# Patient Record
Sex: MALE | Race: WHITE | NOT HISPANIC OR LATINO | Employment: OTHER | URBAN - METROPOLITAN AREA
[De-identification: names, ages, dates, MRNs, and addresses within clinical notes are randomized per-mention and may not be internally consistent; named-entity substitution may affect disease eponyms.]

---

## 2023-11-21 ENCOUNTER — OFFICE VISIT (OUTPATIENT)
Dept: FAMILY MEDICINE CLINIC | Facility: CLINIC | Age: 68
End: 2023-11-21
Payer: MEDICARE

## 2023-11-21 VITALS
RESPIRATION RATE: 18 BRPM | DIASTOLIC BLOOD PRESSURE: 82 MMHG | HEIGHT: 68 IN | BODY MASS INDEX: 31.22 KG/M2 | SYSTOLIC BLOOD PRESSURE: 120 MMHG | OXYGEN SATURATION: 98 % | WEIGHT: 206 LBS | HEART RATE: 72 BPM

## 2023-11-21 DIAGNOSIS — R68.82 DECREASED LIBIDO: ICD-10-CM

## 2023-11-21 DIAGNOSIS — E66.9 OBESITY (BMI 30.0-34.9): Primary | ICD-10-CM

## 2023-11-21 DIAGNOSIS — Z11.59 NEED FOR HEPATITIS C SCREENING TEST: ICD-10-CM

## 2023-11-21 DIAGNOSIS — Z13.220 SCREENING FOR HYPERLIPIDEMIA: ICD-10-CM

## 2023-11-21 DIAGNOSIS — Z00.00 ENCOUNTER FOR ANNUAL WELLNESS EXAM IN MEDICARE PATIENT: ICD-10-CM

## 2023-11-21 DIAGNOSIS — I10 PRIMARY HYPERTENSION: ICD-10-CM

## 2023-11-21 DIAGNOSIS — Z13.228 ENCOUNTER FOR SCREENING FOR OTHER METABOLIC DISORDERS: ICD-10-CM

## 2023-11-21 PROBLEM — E66.811 OBESITY (BMI 30.0-34.9): Status: ACTIVE | Noted: 2023-11-21

## 2023-11-21 LAB
ALBUMIN SERPL-MCNC: 4.6 G/DL (ref 3.5–5.2)
ALBUMIN/GLOB SERPL: 1.9 G/DL
ALP SERPL-CCNC: 83 U/L (ref 39–117)
ALT SERPL-CCNC: 20 U/L (ref 1–41)
AST SERPL-CCNC: 21 U/L (ref 1–40)
BASOPHILS # BLD AUTO: 0.02 10*3/MM3 (ref 0–0.2)
BASOPHILS NFR BLD AUTO: 0.3 % (ref 0–1.5)
BILIRUB SERPL-MCNC: 0.6 MG/DL (ref 0–1.2)
BUN SERPL-MCNC: 24 MG/DL (ref 8–23)
BUN/CREAT SERPL: 15.2 (ref 7–25)
CALCIUM SERPL-MCNC: 9.7 MG/DL (ref 8.6–10.5)
CHLORIDE SERPL-SCNC: 107 MMOL/L (ref 98–107)
CHOLEST SERPL-MCNC: 200 MG/DL (ref 0–200)
CHOLEST/HDLC SERPL: 4.76 {RATIO}
CO2 SERPL-SCNC: 25.1 MMOL/L (ref 22–29)
CREAT SERPL-MCNC: 1.58 MG/DL (ref 0.76–1.27)
EGFRCR SERPLBLD CKD-EPI 2021: 47.4 ML/MIN/1.73
EOSINOPHIL # BLD AUTO: 0.14 10*3/MM3 (ref 0–0.4)
EOSINOPHIL NFR BLD AUTO: 2.4 % (ref 0.3–6.2)
ERYTHROCYTE [DISTWIDTH] IN BLOOD BY AUTOMATED COUNT: 13 % (ref 12.3–15.4)
GLOBULIN SER CALC-MCNC: 2.4 GM/DL
GLUCOSE SERPL-MCNC: 92 MG/DL (ref 65–99)
HCT VFR BLD AUTO: 52.8 % (ref 37.5–51)
HDLC SERPL-MCNC: 42 MG/DL (ref 40–60)
HGB BLD-MCNC: 17.2 G/DL (ref 13–17.7)
IMM GRANULOCYTES # BLD AUTO: 0.02 10*3/MM3 (ref 0–0.05)
IMM GRANULOCYTES NFR BLD AUTO: 0.3 % (ref 0–0.5)
LDLC SERPL CALC-MCNC: 107 MG/DL (ref 0–100)
LYMPHOCYTES # BLD AUTO: 1.77 10*3/MM3 (ref 0.7–3.1)
LYMPHOCYTES NFR BLD AUTO: 29.7 % (ref 19.6–45.3)
MCH RBC QN AUTO: 28.3 PG (ref 26.6–33)
MCHC RBC AUTO-ENTMCNC: 32.6 G/DL (ref 31.5–35.7)
MCV RBC AUTO: 87 FL (ref 79–97)
MONOCYTES # BLD AUTO: 0.56 10*3/MM3 (ref 0.1–0.9)
MONOCYTES NFR BLD AUTO: 9.4 % (ref 5–12)
NEUTROPHILS # BLD AUTO: 3.44 10*3/MM3 (ref 1.7–7)
NEUTROPHILS NFR BLD AUTO: 57.9 % (ref 42.7–76)
NRBC BLD AUTO-RTO: 0 /100 WBC (ref 0–0.2)
PLATELET # BLD AUTO: 206 10*3/MM3 (ref 140–450)
POTASSIUM SERPL-SCNC: 5 MMOL/L (ref 3.5–5.2)
PROT SERPL-MCNC: 7 G/DL (ref 6–8.5)
RBC # BLD AUTO: 6.07 10*6/MM3 (ref 4.14–5.8)
SODIUM SERPL-SCNC: 143 MMOL/L (ref 136–145)
TRIGL SERPL-MCNC: 296 MG/DL (ref 0–150)
VLDLC SERPL CALC-MCNC: 51 MG/DL (ref 5–40)
WBC # BLD AUTO: 5.95 10*3/MM3 (ref 3.4–10.8)

## 2023-11-21 RX ORDER — TELMISARTAN 20 MG/1
10 TABLET ORAL DAILY
COMMUNITY
End: 2023-11-21 | Stop reason: SDUPTHER

## 2023-11-21 RX ORDER — FLUTICASONE PROPIONATE 50 MCG
SPRAY, SUSPENSION (ML) NASAL
COMMUNITY
Start: 2023-09-02

## 2023-11-21 RX ORDER — TELMISARTAN 20 MG/1
10 TABLET ORAL DAILY
Qty: 90 TABLET | Refills: 1 | Status: SHIPPED | OUTPATIENT
Start: 2023-11-21

## 2023-11-21 NOTE — PROGRESS NOTES
The ABCs of the Annual Wellness Visit  Subsequent Medicare Wellness Visit    Subjective      Jenn Gaines is a 68 y.o. male who presents for a Subsequent Medicare Wellness Visit.    Patient has decreased libido. Is an ongoing issue. He reports that this is affecting his marriage. He is requesting referral to urology.    Patient is new to me and new to the office. History of hyptertension, gout, kidney stones, decreased gfr, hearing loss.     The following portions of the patient's history were reviewed and   updated as appropriate: allergies, current medications, past family history, past medical history, past social history, past surgical history, and problem list.    Compared to one year ago, the patient feels his physical   health is the same.    Compared to one year ago, the patient feels his mental   health is worse.    Recent Hospitalizations:  He was not admitted to the hospital during the last year.       Current Medical Providers:  Patient Care Team:  Diana Ocasio APRN as PCP - General (Nurse Practitioner)    Outpatient Medications Prior to Visit   Medication Sig Dispense Refill    fluticasone (FLONASE) 50 MCG/ACT nasal spray       telmisartan (MICARDIS) 20 MG tablet Take 0.5 tablets by mouth Daily.       No facility-administered medications prior to visit.       No opioid medication identified on active medication list. I have reviewed chart for other potential  high risk medication/s and harmful drug interactions in the elderly.        Aspirin is not on active medication list.  Aspirin use is not indicated based on review of current medical condition/s. Risk of harm outweighs potential benefits.  .    Patient Active Problem List   Diagnosis    Obesity (BMI 30.0-34.9)     Advance Care Planning   Advance Care Planning     Advance Directive is not on file.  ACP discussion was held with the patient during this visit. Patient does not have an advance directive, information provided.     Objective   "  Vitals:    23 0858   BP: 120/82   Pulse: 72   Resp: 18   SpO2: 98%   Weight: 93.4 kg (206 lb)   Height: 172.7 cm (68\")     Estimated body mass index is 31.32 kg/m² as calculated from the following:    Height as of this encounter: 172.7 cm (68\").    Weight as of this encounter: 93.4 kg (206 lb).    BMI is >= 30 and <35. (Class 1 Obesity). The following options were offered after discussion;: exercise counseling/recommendations and nutrition counseling/recommendations      Does the patient have evidence of cognitive impairment?   No            HEALTH RISK ASSESSMENT    Smoking Status:  Social History     Tobacco Use   Smoking Status Never   Smokeless Tobacco Never     Alcohol Consumption:  Social History     Substance and Sexual Activity   Alcohol Use Yes     Fall Risk Screen:    STEADI Fall Risk Assessment was completed, and patient is at LOW risk for falls.Assessment completed on:2023    Depression Screenin/21/2023     8:59 AM   PHQ-2/PHQ-9 Depression Screening   Little Interest or Pleasure in Doing Things 0-->not at all   Feeling Down, Depressed or Hopeless 0-->not at all   PHQ-9: Brief Depression Severity Measure Score 0       Health Habits and Functional and Cognitive Screenin/21/2023     9:00 AM   Functional & Cognitive Status   Do you have difficulty preparing food and eating? No   Do you have difficulty bathing yourself, getting dressed or grooming yourself? No   Do you have difficulty using the toilet? No   Do you have difficulty moving around from place to place? No   Do you have trouble with steps or getting out of a bed or a chair? No   Current Diet Well Balanced Diet   Dental Exam Up to date   Eye Exam Up to date   Exercise (times per week) 3 times per week   Current Exercises Include Walking   Do you need help using the phone?  No   Are you deaf or do you have serious difficulty hearing?  No   Do you need help to go to places out of walking distance? No   Do you need " help shopping? No   Do you need help preparing meals?  No   Do you need help with housework?  No   Do you need help with laundry? No   Do you need help taking your medications? No   Do you need help managing money? No   Do you ever drive or ride in a car without wearing a seat belt? No   Have you felt unusual stress, anger or loneliness in the last month? No   Who do you live with? Spouse   If you need help, do you have trouble finding someone available to you? No   Have you been bothered in the last four weeks by sexual problems? No   Do you have difficulty concentrating, remembering or making decisions? No       Age-appropriate Screening Schedule:  Refer to the list below for future screening recommendations based on patient's age, sex and/or medical conditions. Orders for these recommended tests are listed in the plan section. The patient has been provided with a written plan.    Health Maintenance   Topic Date Due    COVID-19 Vaccine (1) Never done    TDAP/TD VACCINES (1 - Tdap) Never done    ZOSTER VACCINE (1 of 2) Never done    Pneumococcal Vaccine 65+ (1 - PCV) Never done    INFLUENZA VACCINE  Never done    HEPATITIS C SCREENING  Never done    COLORECTAL CANCER SCREENING  03/15/2024    ANNUAL WELLNESS VISIT  11/21/2024    BMI FOLLOWUP  11/21/2024                  CMS Preventative Services Quick Reference  Risk Factors Identified During Encounter:    None Identified    The above risks/problems have been discussed with the patient.  Pertinent information has been shared with the patient in the After Visit Summary.    Diagnoses and all orders for this visit:    1. Obesity (BMI 30.0-34.9) (Primary)    2. Encounter for annual wellness exam in Medicare patient    3. Primary hypertension  -     Comprehensive Metabolic Panel  -     telmisartan (MICARDIS) 20 MG tablet; Take 0.5 tablets by mouth Daily.  Dispense: 90 tablet; Refill: 1    4. Decreased libido  -     Ambulatory Referral to Urology    5. Need for hepatitis  C screening test  -     Hepatitis C Antibody    6. Screening for hyperlipidemia  -     Lipid Panel With / Chol / HDL Ratio    7. Encounter for screening for other metabolic disorders  -     CBC & Differential        Follow Up:   Next Medicare Wellness visit to be scheduled in 1 year.      An After Visit Summary and PPPS were made available to the patient.    Lipid panel  CMP  CBC  Hepatitis C Screening  Referral to urology

## 2023-11-22 LAB — HCV IGG SERPL QL IA: NON REACTIVE

## 2023-11-23 DIAGNOSIS — I10 PRIMARY HYPERTENSION: ICD-10-CM

## 2023-11-23 DIAGNOSIS — R79.89 ELEVATED SERUM CREATININE: Primary | ICD-10-CM

## 2024-03-07 ENCOUNTER — LAB (OUTPATIENT)
Dept: LAB | Facility: HOSPITAL | Age: 69
End: 2024-03-07
Payer: MEDICARE

## 2024-03-07 ENCOUNTER — CONSULT (OUTPATIENT)
Dept: ONCOLOGY | Facility: CLINIC | Age: 69
End: 2024-03-07
Payer: MEDICARE

## 2024-03-07 VITALS
HEART RATE: 67 BPM | RESPIRATION RATE: 18 BRPM | TEMPERATURE: 97.9 F | BODY MASS INDEX: 30.66 KG/M2 | OXYGEN SATURATION: 95 % | SYSTOLIC BLOOD PRESSURE: 136 MMHG | DIASTOLIC BLOOD PRESSURE: 91 MMHG | HEIGHT: 68 IN | WEIGHT: 202.3 LBS

## 2024-03-07 DIAGNOSIS — D75.1 POLYCYTHEMIA: Primary | ICD-10-CM

## 2024-03-07 DIAGNOSIS — E87.70 HYPERVOLEMIA, UNSPECIFIED HYPERVOLEMIA TYPE: Primary | ICD-10-CM

## 2024-03-07 LAB
BASOPHILS # BLD AUTO: 0.03 10*3/MM3 (ref 0–0.2)
BASOPHILS NFR BLD AUTO: 0.4 % (ref 0–1.5)
DEPRECATED RDW RBC AUTO: 37.8 FL (ref 37–54)
EOSINOPHIL # BLD AUTO: 0.12 10*3/MM3 (ref 0–0.4)
EOSINOPHIL NFR BLD AUTO: 1.5 % (ref 0.3–6.2)
ERYTHROCYTE [DISTWIDTH] IN BLOOD BY AUTOMATED COUNT: 12.5 % (ref 12.3–15.4)
HCT VFR BLD AUTO: 48.8 % (ref 37.5–51)
HGB BLD-MCNC: 17.5 G/DL (ref 13–17.7)
IMM GRANULOCYTES # BLD AUTO: 0.13 10*3/MM3 (ref 0–0.05)
IMM GRANULOCYTES NFR BLD AUTO: 1.6 % (ref 0–0.5)
LYMPHOCYTES # BLD AUTO: 1.67 10*3/MM3 (ref 0.7–3.1)
LYMPHOCYTES NFR BLD AUTO: 20.5 % (ref 19.6–45.3)
MCH RBC QN AUTO: 29.8 PG (ref 26.6–33)
MCHC RBC AUTO-ENTMCNC: 35.9 G/DL (ref 31.5–35.7)
MCV RBC AUTO: 83.1 FL (ref 79–97)
MONOCYTES # BLD AUTO: 0.76 10*3/MM3 (ref 0.1–0.9)
MONOCYTES NFR BLD AUTO: 9.3 % (ref 5–12)
NEUTROPHILS NFR BLD AUTO: 5.42 10*3/MM3 (ref 1.7–7)
NEUTROPHILS NFR BLD AUTO: 66.7 % (ref 42.7–76)
NRBC BLD AUTO-RTO: 0 /100 WBC (ref 0–0.2)
PLATELET # BLD AUTO: 208 10*3/MM3 (ref 140–450)
PMV BLD AUTO: 9.5 FL (ref 6–12)
RBC # BLD AUTO: 5.87 10*6/MM3 (ref 4.14–5.8)
WBC NRBC COR # BLD AUTO: 8.13 10*3/MM3 (ref 3.4–10.8)

## 2024-03-07 PROCEDURE — 36415 COLL VENOUS BLD VENIPUNCTURE: CPT

## 2024-03-07 PROCEDURE — 85025 COMPLETE CBC W/AUTO DIFF WBC: CPT

## 2024-03-07 RX ORDER — ALLOPURINOL 100 MG/1
1 TABLET ORAL DAILY
COMMUNITY
Start: 2024-01-24 | End: 2025-01-23

## 2024-03-07 RX ORDER — AMLODIPINE BESYLATE 5 MG/1
1 TABLET ORAL DAILY
COMMUNITY
Start: 2024-01-24 | End: 2025-01-23

## 2024-03-07 RX ORDER — COLCHICINE 0.6 MG/1
0.6 TABLET ORAL
COMMUNITY

## 2024-03-07 NOTE — PROGRESS NOTES
[facility]    CONSULTING IN BLOOD DISORDERS & CANCER      REASON FOR CONSULTATION/CHIEF COMPLAINT:     Evaluation and management for polycythemia                             REQUESTING PHYSICIAN: Will Padilla Jr.*  RECORDS OBTAINED:  Records of the patients history including those from the electronic medical record were reviewed and summarized in detail.    HISTORY OF PRESENT ILLNESS:    The patient is a 68 y.o. year old male with medical history significant for hypertension, gout and CKD-3 who was noted to have elevated hemoglobin/hematocrit of 18.0/53.1 on a routine CBC performed by his urologist, Dr. Padilla on 2/9/2024.  WBC, platelets and differential were within normal range.    Patient was referred to hematology for further evaluation.  On review of his chart, he had elevated hemoglobin of 17.2 back in November 2023 as well.  No prior blood test results are available at this time.   Patient states he moved to [place] only around October - November 2023.  Prior to that he was living in [place] and [place].  Patient denies any knowledge of having abnormally elevated RBC/hemoglobin or any other blood abnormalities.    No history of thrombosis.  Is a non-smoker.  He does have erectile dysfunction, however does not take any testosterone supplements.  Patient does report of snoring at nighttime and having issues with sleep apnea whenever he gains weight.  He has been trying to lose weight.  No family history of blood or bone marrow disorders.    Past Medical History:   Diagnosis Date    Decreased GFR     Gout     HL (hearing loss)     Hypertension     Kidney stone      No past surgical history on file.    MEDICATIONS    Current Outpatient Medications:     allopurinol (ZYLOPRIM) 100 MG tablet, Take 1 tablet by mouth Daily., Disp: , Rfl:     amLODIPine (NORVASC) 5 MG tablet, Take 1 tablet by mouth Daily., Disp: , Rfl:     colchicine 0.6 MG tablet, Take 1 tablet by mouth., Disp: , Rfl:     fluticasone  "(FLONASE) 50 MCG/ACT nasal spray, , Disp: , Rfl:     telmisartan (MICARDIS) 20 MG tablet, Take 0.5 tablets by mouth Daily., Disp: 90 tablet, Rfl: 1    ALLERGIES:   No Known Allergies    SOCIAL HISTORY:       Social History     Socioeconomic History    Marital status:    Tobacco Use    Smoking status: Never    Smokeless tobacco: Never   Substance and Sexual Activity    Alcohol use: Yes    Drug use: Never    Sexual activity: Defer         FAMILY HISTORY:  Family History   Problem Relation Age of Onset    No Known Problems Mother     Other Father          from accident    Cancer Father     No Known Problems Sister        REVIEW OF SYSTEMS:  As per HPI       Vitals:    24 1000   BP: 136/91   Pulse: 67   Resp: 18   Temp: 97.9 °F (36.6 °C)   TempSrc: Temporal   SpO2: 95%   Weight: 91.8 kg (202 lb 4.8 oz)   Height: 172.7 cm (67.99\")   PainSc: 0-No pain         3/7/2024     9:55 AM   Current Status   ECOG score 0      PHYSICAL EXAM:    CONSTITUTIONAL:  Vital signs reviewed.  No distress, looks comfortable.  EYES:  Conjunctiva and lids unremarkable.   EARS,NOSE,MOUTH,THROAT:  Ears and nose appear unremarkable.  Lips, teeth, gums appear unremarkable.  RESPIRATORY:  Normal respiratory effort.  Lungs clear to auscultation bilaterally.  CARDIOVASCULAR:  Normal S1, S2.  No murmurs rubs or gallops.  No significant lower extremity edema.  GASTROINTESTINAL: Abdomen appears unremarkable.  Nondistended  LYMPHATIC:  No cervical, supraclavicular lymphadenopathy.  NEURO: AAO x 3, no focal deficits.  Appears to have equal strength all 4 extremities.  MUSCULOSKELETAL:  Unremarkable digits/nails.  No cyanosis or clubbing.  No apparent joint deformities.  SKIN:  Warm.  No rashes.  PSYCHIATRIC:  Normal judgment and insight.  Normal mood and affect.     RECENT LABS:        Lab on 2024   Component Date Value Ref Range Status    WBC 2024 8.13  3.40 - 10.80 10*3/mm3 Final    RBC 2024 5.87 (H)  4.14 - 5.80 " 10*6/mm3 Final    Hemoglobin 03/07/2024 17.5  13.0 - 17.7 g/dL Final    Hematocrit 03/07/2024 48.8  37.5 - 51.0 % Final    MCV 03/07/2024 83.1  79.0 - 97.0 fL Final    MCH 03/07/2024 29.8  26.6 - 33.0 pg Final    MCHC 03/07/2024 35.9 (H)  31.5 - 35.7 g/dL Final    RDW 03/07/2024 12.5  12.3 - 15.4 % Final    RDW-SD 03/07/2024 37.8  37.0 - 54.0 fl Final    MPV 03/07/2024 9.5  6.0 - 12.0 fL Final    Platelets 03/07/2024 208  140 - 450 10*3/mm3 Final    Neutrophil % 03/07/2024 66.7  42.7 - 76.0 % Final    Lymphocyte % 03/07/2024 20.5  19.6 - 45.3 % Final    Monocyte % 03/07/2024 9.3  5.0 - 12.0 % Final    Eosinophil % 03/07/2024 1.5  0.3 - 6.2 % Final    Basophil % 03/07/2024 0.4  0.0 - 1.5 % Final    Immature Grans % 03/07/2024 1.6 (H)  0.0 - 0.5 % Final    Neutrophils, Absolute 03/07/2024 5.42  1.70 - 7.00 10*3/mm3 Final    Lymphocytes, Absolute 03/07/2024 1.67  0.70 - 3.10 10*3/mm3 Final    Monocytes, Absolute 03/07/2024 0.76  0.10 - 0.90 10*3/mm3 Final    Eosinophils, Absolute 03/07/2024 0.12  0.00 - 0.40 10*3/mm3 Final    Basophils, Absolute 03/07/2024 0.03  0.00 - 0.20 10*3/mm3 Final    Immature Grans, Absolute 03/07/2024 0.13 (H)  0.00 - 0.05 10*3/mm3 Final    nRBC 03/07/2024 0.0  0.0 - 0.2 /100 WBC Final         ASSESSMENT:  Patient is a pleasant 68-year-old male with medical history significant for hypertension, gout and CKD-3 who comes for polyp team evaluation and management.     # Polycythemia:   Patient was noted to have elevated hemoglobin/hematocrit of 18.0/53.1 on a routine CBC performed by his urologist, Dr. Padilla on 2/9/2024.  WBC, platelets and differential were within normal range.  On review of his chart, he had elevated hemoglobin of 17.2 back in November 2023 as well.  No prior blood test results are available at this time.   Patient states he moved to [place] only around October - November 2023.  Prior to that he was living in [place] and [place].  Patient denies any knowledge of having  abnormally elevated RBC/hemoglobin or any other blood abnormalities.  No history of thrombosis.  Is a non-smoker.  He does have erectile dysfunction, however does not take any testosterone supplements.  Patient does report of snoring at nighttime and having issues with sleep apnea whenever he gains weight.  He has been trying to lose weight. No family history of blood or bone marrow disorders.  The CBC performed in clinic on 3/7/2024 noted high normal hemoglobin of 17.5 and hematocrit of 48.8.  Patient does not have any symptoms of hyperviscosity.  Since hemoglobin is still within normal range, will defer further workup at this time.    Informed patient that his mild polycythemia is likely related to dehydration and underlying sleep apnea.  Patient was encouraged to maintain adequate hydration and possibly lose some weight if he can.  He is also considering blood donation when can.  Plan made to repeat CBC and check EPO level at follow-up in 6 months.    # ED: As per urology    # Hypertension and CKD: As per PCP    PLAN:   -Likely dehydration and sleep apnea related mild polycythemia  -Advised adequate hydration and losing weight  -Follow-up in 6 months with repeat labs or sooner if needed    Orders Placed This Encounter   Procedures    Erythropoietin     Standing Status:   Future     Standing Expiration Date:   3/7/2025     Order Specific Question:   Release to patient     Answer:   Routine Release [[id number]]    CBC & Differential     Standing Status:   Future     Standing Expiration Date:   3/7/2025     Order Specific Question:   Manual Differential     Answer:   No     Order Specific Question:   Release to patient     Answer:   Routine Release [[id number]]   Total time spent during this patient encounter is 65 minutes. The total time spent with the patient includes: preparing to see the patient by reviewing of tests, prior notes or other relevant information, performing appropriate independent examination &  evaluation, counseling, ordering of medications, tests or procedures, documenting clinic information in the electronic medical records or other health records, independently interpreting results of tests and communicating the results to the patient/family or caregiver.

## 2024-03-08 ENCOUNTER — PATIENT ROUNDING (BHMG ONLY) (OUTPATIENT)
Dept: ONCOLOGY | Facility: CLINIC | Age: 69
End: 2024-03-08
Payer: MEDICARE

## 2024-07-03 DIAGNOSIS — I10 PRIMARY HYPERTENSION: ICD-10-CM

## 2024-07-03 DIAGNOSIS — R79.89 ELEVATED SERUM CREATININE: Primary | ICD-10-CM

## 2024-07-18 ENCOUNTER — TRANSCRIBE ORDERS (OUTPATIENT)
Dept: ADMINISTRATIVE | Facility: HOSPITAL | Age: 69
End: 2024-07-18
Payer: MEDICARE

## 2024-07-18 DIAGNOSIS — N18.31 STAGE 3A CHRONIC KIDNEY DISEASE (CKD): Primary | ICD-10-CM

## 2024-07-24 ENCOUNTER — HOSPITAL ENCOUNTER (OUTPATIENT)
Facility: HOSPITAL | Age: 69
Discharge: HOME | End: 2024-07-24
Admitting: INTERNAL MEDICINE
Payer: MEDICARE

## 2024-07-24 DIAGNOSIS — N18.31 STAGE 3A CHRONIC KIDNEY DISEASE (CKD): ICD-10-CM

## 2024-07-24 PROCEDURE — 76775 US EXAM ABDO BACK WALL LIM: CPT

## 2024-08-29 ENCOUNTER — OFFICE VISIT (OUTPATIENT)
Dept: ONCOLOGY | Facility: CLINIC | Age: 69
End: 2024-08-29
Payer: MEDICARE

## 2024-08-29 ENCOUNTER — LAB (OUTPATIENT)
Dept: LAB | Facility: HOSPITAL | Age: 69
End: 2024-08-29
Payer: MEDICARE

## 2024-08-29 VITALS
BODY MASS INDEX: 28.89 KG/M2 | TEMPERATURE: 98.2 F | RESPIRATION RATE: 16 BRPM | OXYGEN SATURATION: 95 % | HEIGHT: 68 IN | HEART RATE: 71 BPM | SYSTOLIC BLOOD PRESSURE: 138 MMHG | DIASTOLIC BLOOD PRESSURE: 89 MMHG | WEIGHT: 190.6 LBS

## 2024-08-29 DIAGNOSIS — D75.1 POLYCYTHEMIA: ICD-10-CM

## 2024-08-29 DIAGNOSIS — D75.1 POLYCYTHEMIA: Primary | ICD-10-CM

## 2024-08-29 LAB
ALBUMIN SERPL-MCNC: 4.2 G/DL (ref 3.5–5.2)
ALBUMIN/GLOB SERPL: 1.6 G/DL
ALP SERPL-CCNC: 83 U/L (ref 39–117)
ALT SERPL W P-5'-P-CCNC: 23 U/L (ref 1–41)
ANION GAP SERPL CALCULATED.3IONS-SCNC: 9.8 MMOL/L (ref 5–15)
AST SERPL-CCNC: 26 U/L (ref 1–40)
BASOPHILS # BLD AUTO: 0.03 10*3/MM3 (ref 0–0.2)
BASOPHILS NFR BLD AUTO: 0.4 % (ref 0–1.5)
BILIRUB SERPL-MCNC: 0.7 MG/DL (ref 0–1.2)
BUN SERPL-MCNC: 30 MG/DL (ref 8–23)
BUN/CREAT SERPL: 14.8 (ref 7–25)
CALCIUM SPEC-SCNC: 9.5 MG/DL (ref 8.6–10.5)
CHLORIDE SERPL-SCNC: 104 MMOL/L (ref 98–107)
CO2 SERPL-SCNC: 25.2 MMOL/L (ref 22–29)
CREAT SERPL-MCNC: 2.03 MG/DL (ref 0.76–1.27)
DEPRECATED RDW RBC AUTO: 39.3 FL (ref 37–54)
EGFRCR SERPLBLD CKD-EPI 2021: 34.8 ML/MIN/1.73
EOSINOPHIL # BLD AUTO: 0.16 10*3/MM3 (ref 0–0.4)
EOSINOPHIL NFR BLD AUTO: 1.9 % (ref 0.3–6.2)
ERYTHROCYTE [DISTWIDTH] IN BLOOD BY AUTOMATED COUNT: 12.4 % (ref 12.3–15.4)
GLOBULIN UR ELPH-MCNC: 2.7 GM/DL
GLUCOSE SERPL-MCNC: 99 MG/DL (ref 65–99)
HCT VFR BLD AUTO: 52.6 % (ref 37.5–51)
HGB BLD-MCNC: 17.2 G/DL (ref 13–17.7)
IMM GRANULOCYTES # BLD AUTO: 0.03 10*3/MM3 (ref 0–0.05)
IMM GRANULOCYTES NFR BLD AUTO: 0.4 % (ref 0–0.5)
LYMPHOCYTES # BLD AUTO: 1.78 10*3/MM3 (ref 0.7–3.1)
LYMPHOCYTES NFR BLD AUTO: 20.8 % (ref 19.6–45.3)
MCH RBC QN AUTO: 28.5 PG (ref 26.6–33)
MCHC RBC AUTO-ENTMCNC: 32.7 G/DL (ref 31.5–35.7)
MCV RBC AUTO: 87.2 FL (ref 79–97)
MONOCYTES # BLD AUTO: 0.73 10*3/MM3 (ref 0.1–0.9)
MONOCYTES NFR BLD AUTO: 8.5 % (ref 5–12)
NEUTROPHILS NFR BLD AUTO: 5.81 10*3/MM3 (ref 1.7–7)
NEUTROPHILS NFR BLD AUTO: 68 % (ref 42.7–76)
NRBC BLD AUTO-RTO: 0 /100 WBC (ref 0–0.2)
PLATELET # BLD AUTO: 203 10*3/MM3 (ref 140–450)
PMV BLD AUTO: 9.2 FL (ref 6–12)
POTASSIUM SERPL-SCNC: 4.4 MMOL/L (ref 3.5–5.2)
PROT SERPL-MCNC: 6.9 G/DL (ref 6–8.5)
RBC # BLD AUTO: 6.03 10*6/MM3 (ref 4.14–5.8)
SODIUM SERPL-SCNC: 139 MMOL/L (ref 136–145)
WBC NRBC COR # BLD AUTO: 8.54 10*3/MM3 (ref 3.4–10.8)

## 2024-08-29 PROCEDURE — 36415 COLL VENOUS BLD VENIPUNCTURE: CPT

## 2024-08-29 PROCEDURE — 80053 COMPREHEN METABOLIC PANEL: CPT | Performed by: INTERNAL MEDICINE

## 2024-08-29 PROCEDURE — 85025 COMPLETE CBC W/AUTO DIFF WBC: CPT

## 2024-08-29 NOTE — PROGRESS NOTES
[facility]    CONSULTING IN BLOOD DISORDERS & CANCER      REASON FOR CONSULTATION/CHIEF COMPLAINT:     Evaluation and management for polycythemia                             REQUESTING PHYSICIAN: Will Padilla Jr.*  RECORDS OBTAINED:  Records of the patients history including those from the electronic medical record were reviewed and summarized in detail.    HISTORY OF PRESENT ILLNESS:    The patient is a 69 y.o. year old male with medical history significant for hypertension, gout and CKD-3 who was noted to have elevated hemoglobin/hematocrit of 18.0/53.1 on a routine CBC performed by his urologist, Dr. Padilla on 2/9/2024.  WBC, platelets and differential were within normal range.    Patient was referred to hematology for further evaluation.  On review of his chart, he had elevated hemoglobin of 17.2 back in November 2023 as well.  No prior blood test results are available at this time.   Patient states he moved to [place] only around October - November 2023.  Prior to that he was living in [place] and [place].  Patient denies any knowledge of having abnormally elevated RBC/hemoglobin or any other blood abnormalities.    No history of thrombosis.  Is a non-smoker.  He does have erectile dysfunction, however does not take any testosterone supplements.  Patient does report of snoring at nighttime and having issues with sleep apnea whenever he gains weight.  He has been trying to lose weight.  No family history of blood or bone marrow disorders.    Interim history:  Patient returns to the clinic for a follow-up visit.  Denies any new complaints since last visit.  Says he has been trying to stay active and eat healthy.  He has lost some weight since last visit.    Past Medical History:   Diagnosis Date    Decreased GFR     Gout     HL (hearing loss)     Hypertension     Kidney stone      No past surgical history on file.    MEDICATIONS    Current Outpatient Medications:     allopurinol (ZYLOPRIM) 100 MG  "tablet, Take 1 tablet by mouth Daily., Disp: , Rfl:     amLODIPine (NORVASC) 5 MG tablet, Take 1 tablet by mouth Daily., Disp: , Rfl:     colchicine 0.6 MG tablet, Take 1 tablet by mouth., Disp: , Rfl:     fluticasone (FLONASE) 50 MCG/ACT nasal spray, , Disp: , Rfl:     telmisartan (MICARDIS) 20 MG tablet, Take 0.5 tablets by mouth Daily. (Patient not taking: Reported on 2024), Disp: 90 tablet, Rfl: 1    ALLERGIES:   No Known Allergies    SOCIAL HISTORY:       Social History     Socioeconomic History    Marital status:    Tobacco Use    Smoking status: Never    Smokeless tobacco: Never   Substance and Sexual Activity    Alcohol use: Yes    Drug use: Never    Sexual activity: Defer         FAMILY HISTORY:  Family History   Problem Relation Age of Onset    No Known Problems Mother     Other Father          from accident    Cancer Father     No Known Problems Sister        REVIEW OF SYSTEMS:  As per HPI       Vitals:    24 1316   BP: 138/89   Pulse: 71   Resp: 16   Temp: 98.2 °F (36.8 °C)   TempSrc: Oral   SpO2: 95%   Weight: 86.5 kg (190 lb 9.6 oz)   Height: 172.7 cm (67.99\")   PainSc: 0-No pain         2024     1:19 PM   Current Status   ECOG score 0      PHYSICAL EXAM:    CONSTITUTIONAL:  Vital signs reviewed.  No distress, looks comfortable.  EYES:  Conjunctiva and lids unremarkable.   EARS,NOSE,MOUTH,THROAT:  Ears and nose appear unremarkable.  Lips, teeth, gums appear unremarkable.  RESPIRATORY:  Normal respiratory effort.  Lungs clear to auscultation bilaterally.  CARDIOVASCULAR:  Normal S1, S2.  No murmurs rubs or gallops.  No significant lower extremity edema.  GASTROINTESTINAL: Abdomen appears unremarkable.  Nondistended  LYMPHATIC:  No cervical, supraclavicular lymphadenopathy.  NEURO: AAO x 3, no focal deficits.  Appears to have equal strength all 4 extremities.  MUSCULOSKELETAL:  Unremarkable digits/nails.  No cyanosis or clubbing.  No apparent joint deformities.  SKIN:  Warm.  " No rashes.  PSYCHIATRIC:  Normal judgment and insight.  Normal mood and affect.     RECENT LABS:        Office Visit on 08/29/2024   Component Date Value Ref Range Status    Glucose 08/29/2024 99  65 - 99 mg/dL Final    BUN 08/29/2024 30 (H)  8 - 23 mg/dL Final    Creatinine 08/29/2024 2.03 (C)  0.76 - 1.27 mg/dL Final    Sodium 08/29/2024 139  136 - 145 mmol/L Final    Potassium 08/29/2024 4.4  3.5 - 5.2 mmol/L Final    Chloride 08/29/2024 104  98 - 107 mmol/L Final    CO2 08/29/2024 25.2  22.0 - 29.0 mmol/L Final    Calcium 08/29/2024 9.5  8.6 - 10.5 mg/dL Final    Total Protein 08/29/2024 6.9  6.0 - 8.5 g/dL Final    Albumin 08/29/2024 4.2  3.5 - 5.2 g/dL Final    ALT (SGPT) 08/29/2024 23  1 - 41 U/L Final    AST (SGOT) 08/29/2024 26  1 - 40 U/L Final    Alkaline Phosphatase 08/29/2024 83  39 - 117 U/L Final    Total Bilirubin 08/29/2024 0.7  0.0 - 1.2 mg/dL Final    Globulin 08/29/2024 2.7  gm/dL Final    A/G Ratio 08/29/2024 1.6  g/dL Final    BUN/Creatinine Ratio 08/29/2024 14.8  7.0 - 25.0 Final    Anion Gap 08/29/2024 9.8  5.0 - 15.0 mmol/L Final    eGFR 08/29/2024 34.8 (L)  >60.0 mL/min/1.73 Final   Lab on 08/29/2024   Component Date Value Ref Range Status    WBC 08/29/2024 8.54  3.40 - 10.80 10*3/mm3 Final    RBC 08/29/2024 6.03 (H)  4.14 - 5.80 10*6/mm3 Final    Hemoglobin 08/29/2024 17.2  13.0 - 17.7 g/dL Final    Hematocrit 08/29/2024 52.6 (H)  37.5 - 51.0 % Final    MCV 08/29/2024 87.2  79.0 - 97.0 fL Final    MCH 08/29/2024 28.5  26.6 - 33.0 pg Final    MCHC 08/29/2024 32.7  31.5 - 35.7 g/dL Final    RDW 08/29/2024 12.4  12.3 - 15.4 % Final    RDW-SD 08/29/2024 39.3  37.0 - 54.0 fl Final    MPV 08/29/2024 9.2  6.0 - 12.0 fL Final    Platelets 08/29/2024 203  140 - 450 10*3/mm3 Final    Neutrophil % 08/29/2024 68.0  42.7 - 76.0 % Final    Lymphocyte % 08/29/2024 20.8  19.6 - 45.3 % Final    Monocyte % 08/29/2024 8.5  5.0 - 12.0 % Final    Eosinophil % 08/29/2024 1.9  0.3 - 6.2 % Final    Basophil %  08/29/2024 0.4  0.0 - 1.5 % Final    Immature Grans % 08/29/2024 0.4  0.0 - 0.5 % Final    Neutrophils, Absolute 08/29/2024 5.81  1.70 - 7.00 10*3/mm3 Final    Lymphocytes, Absolute 08/29/2024 1.78  0.70 - 3.10 10*3/mm3 Final    Monocytes, Absolute 08/29/2024 0.73  0.10 - 0.90 10*3/mm3 Final    Eosinophils, Absolute 08/29/2024 0.16  0.00 - 0.40 10*3/mm3 Final    Basophils, Absolute 08/29/2024 0.03  0.00 - 0.20 10*3/mm3 Final    Immature Grans, Absolute 08/29/2024 0.03  0.00 - 0.05 10*3/mm3 Final    nRBC 08/29/2024 0.0  0.0 - 0.2 /100 WBC Final         ASSESSMENT:  Patient is a pleasant 68-year-old male with medical history significant for hypertension, gout and CKD-3 who comes for polyp team evaluation and management.     # Polycythemia:   Patient was noted to have elevated hemoglobin/hematocrit of 18.0/53.1 on a routine CBC performed by his urologist, Dr. Padilla on 2/9/2024.  WBC, platelets and differential were within normal range.  On review of his chart, he had elevated hemoglobin of 17.2 back in November 2023 as well.  No prior blood test results are available at this time.   Patient states he moved to [place] only around October - November 2023.  Prior to that he was living in [place] and [place].  Patient denies any knowledge of having abnormally elevated RBC/hemoglobin or any other blood abnormalities.  No history of thrombosis.  Is a non-smoker.  He does have erectile dysfunction, however does not take any testosterone supplements.  Patient does report of snoring at nighttime and having issues with sleep apnea whenever he gains weight.  He has been trying to lose weight. No family history of blood or bone marrow disorders.  The CBC performed in clinic on 3/7/2024 noted high normal hemoglobin of 17.5 and hematocrit of 48.8.  Patient does not have any symptoms of hyperviscosity.  Since hemoglobin is still within normal range, will defer further workup at this time.    8/29/2024: CBC from today show  mildly elevated hemoglobin of 17.2 with hematocrit of 52.6-overall stable.  Patient has lost some weight since last visit.  He thinks the snoring has improved as well. Informed patient that his mild polycythemia is likely related to dehydration and underlying sleep apnea.  Patient was encouraged to maintain adequate hydration and possibly lose some weight if he can.  He is also considering blood donation when can.  follow-up in 6 months with repeat labs.    # ED: As per urology    # Hypertension and CKD: Following up with nephrology.  Encouraged to increase hydration.  As per PCP    PLAN:   -Likely dehydration and sleep apnea related mild polycythemia  -Advised adequate hydration and losing weight  -Follow-up in 6 months with repeat labs or sooner if needed    Orders Placed This Encounter   Procedures    Comprehensive Metabolic Panel     Order Specific Question:   Release to patient     Answer:   Routine Release [[id number]]    Comprehensive Metabolic Panel     Standing Status:   Future     Standing Expiration Date:   8/29/2025     Order Specific Question:   Release to patient     Answer:   Routine Release [[id number]]    CBC & Differential     Standing Status:   Future     Standing Expiration Date:   8/29/2025     Order Specific Question:   Manual Differential     Answer:   No     Order Specific Question:   Release to patient     Answer:   Routine Release [[id number]]   Total time spent during this patient encounter is 42 minutes. The total time spent with the patient includes: preparing to see the patient by reviewing of tests, prior notes or other relevant information, performing appropriate independent examination & evaluation, counseling, ordering of medications, tests or procedures, documenting clinic information in the electronic medical records or other health records, independently interpreting results of tests and communicating the results to the patient/family or caregiver.

## 2024-08-30 LAB — EPO SERPL-ACNC: 8.8 MIU/ML (ref 2.6–18.5)

## 2025-01-22 DIAGNOSIS — R79.89 ELEVATED SERUM CREATININE: Primary | ICD-10-CM

## 2025-01-22 DIAGNOSIS — I10 PRIMARY HYPERTENSION: ICD-10-CM

## 2025-02-06 ENCOUNTER — LAB (OUTPATIENT)
Dept: LAB | Facility: HOSPITAL | Age: 70
End: 2025-02-06
Payer: MEDICARE

## 2025-02-06 ENCOUNTER — OFFICE VISIT (OUTPATIENT)
Dept: ONCOLOGY | Facility: CLINIC | Age: 70
End: 2025-02-06
Payer: MEDICARE

## 2025-02-06 VITALS
DIASTOLIC BLOOD PRESSURE: 89 MMHG | HEIGHT: 68 IN | BODY MASS INDEX: 30.04 KG/M2 | WEIGHT: 198.2 LBS | TEMPERATURE: 98.1 F | OXYGEN SATURATION: 96 % | HEART RATE: 70 BPM | RESPIRATION RATE: 17 BRPM | SYSTOLIC BLOOD PRESSURE: 130 MMHG

## 2025-02-06 DIAGNOSIS — D75.1 POLYCYTHEMIA: Primary | ICD-10-CM

## 2025-02-06 DIAGNOSIS — D75.1 POLYCYTHEMIA: ICD-10-CM

## 2025-02-06 LAB
ALBUMIN SERPL-MCNC: 4.1 G/DL (ref 3.5–5.2)
ALBUMIN/GLOB SERPL: 1.5 G/DL
ALP SERPL-CCNC: 82 U/L (ref 39–117)
ALT SERPL W P-5'-P-CCNC: 18 U/L (ref 1–41)
ANION GAP SERPL CALCULATED.3IONS-SCNC: 10.7 MMOL/L (ref 5–15)
AST SERPL-CCNC: 24 U/L (ref 1–40)
BASOPHILS # BLD AUTO: 0.02 10*3/MM3 (ref 0–0.2)
BASOPHILS NFR BLD AUTO: 0.3 % (ref 0–1.5)
BILIRUB SERPL-MCNC: 0.7 MG/DL (ref 0–1.2)
BUN SERPL-MCNC: 25 MG/DL (ref 8–23)
BUN/CREAT SERPL: 15.5 (ref 7–25)
CALCIUM SPEC-SCNC: 9.5 MG/DL (ref 8.6–10.5)
CHLORIDE SERPL-SCNC: 106 MMOL/L (ref 98–107)
CO2 SERPL-SCNC: 23.3 MMOL/L (ref 22–29)
CREAT SERPL-MCNC: 1.61 MG/DL (ref 0.76–1.27)
DEPRECATED RDW RBC AUTO: 38 FL (ref 37–54)
EGFRCR SERPLBLD CKD-EPI 2021: 46 ML/MIN/1.73
EOSINOPHIL # BLD AUTO: 0.13 10*3/MM3 (ref 0–0.4)
EOSINOPHIL NFR BLD AUTO: 2 % (ref 0.3–6.2)
ERYTHROCYTE [DISTWIDTH] IN BLOOD BY AUTOMATED COUNT: 12.4 % (ref 12.3–15.4)
GLOBULIN UR ELPH-MCNC: 2.8 GM/DL
GLUCOSE SERPL-MCNC: 117 MG/DL (ref 65–99)
HCT VFR BLD AUTO: 49.7 % (ref 37.5–51)
HGB BLD-MCNC: 16.7 G/DL (ref 13–17.7)
IMM GRANULOCYTES # BLD AUTO: 0.03 10*3/MM3 (ref 0–0.05)
IMM GRANULOCYTES NFR BLD AUTO: 0.5 % (ref 0–0.5)
LYMPHOCYTES # BLD AUTO: 1.73 10*3/MM3 (ref 0.7–3.1)
LYMPHOCYTES NFR BLD AUTO: 27.2 % (ref 19.6–45.3)
MCH RBC QN AUTO: 28.6 PG (ref 26.6–33)
MCHC RBC AUTO-ENTMCNC: 33.6 G/DL (ref 31.5–35.7)
MCV RBC AUTO: 85.2 FL (ref 79–97)
MONOCYTES # BLD AUTO: 0.49 10*3/MM3 (ref 0.1–0.9)
MONOCYTES NFR BLD AUTO: 7.7 % (ref 5–12)
NEUTROPHILS NFR BLD AUTO: 3.97 10*3/MM3 (ref 1.7–7)
NEUTROPHILS NFR BLD AUTO: 62.3 % (ref 42.7–76)
NRBC BLD AUTO-RTO: 0 /100 WBC (ref 0–0.2)
PLATELET # BLD AUTO: 173 10*3/MM3 (ref 140–450)
PMV BLD AUTO: 9.1 FL (ref 6–12)
POTASSIUM SERPL-SCNC: 4 MMOL/L (ref 3.5–5.2)
PROT SERPL-MCNC: 6.9 G/DL (ref 6–8.5)
RBC # BLD AUTO: 5.83 10*6/MM3 (ref 4.14–5.8)
SODIUM SERPL-SCNC: 140 MMOL/L (ref 136–145)
WBC NRBC COR # BLD AUTO: 6.37 10*3/MM3 (ref 3.4–10.8)

## 2025-02-06 PROCEDURE — 85025 COMPLETE CBC W/AUTO DIFF WBC: CPT

## 2025-02-06 PROCEDURE — 36415 COLL VENOUS BLD VENIPUNCTURE: CPT

## 2025-02-06 PROCEDURE — 80053 COMPREHEN METABOLIC PANEL: CPT

## 2025-02-06 NOTE — PROGRESS NOTES
[facility]    CONSULTING IN BLOOD DISORDERS & CANCER      REASON FOR CONSULTATION/CHIEF COMPLAINT:     Evaluation and management for polycythemia                             REQUESTING PHYSICIAN: No ref. provider found  RECORDS OBTAINED:  Records of the patients history including those from the electronic medical record were reviewed and summarized in detail.    HISTORY OF PRESENT ILLNESS:    The patient is a 69 y.o. year old male with medical history significant for hypertension, gout and CKD-3 who was noted to have elevated hemoglobin/hematocrit of 18.0/53.1 on a routine CBC performed by his urologist, Dr. Padilla on 2/9/2024.  WBC, platelets and differential were within normal range.    Patient was referred to hematology for further evaluation.  On review of his chart, he had elevated hemoglobin of 17.2 back in November 2023 as well.  No prior blood test results are available at this time.   Patient states he moved to [place] only around October - November 2023.  Prior to that he was living in [place] and [place].  Patient denies any knowledge of having abnormally elevated RBC/hemoglobin or any other blood abnormalities.    No history of thrombosis.  Is a non-smoker.  He does have erectile dysfunction, however does not take any testosterone supplements.  Patient does report of snoring at nighttime and having issues with sleep apnea whenever he gains weight.  He has been trying to lose weight.  No family history of blood or bone marrow disorders.    Interim history:  Patient returns to the clinic for a follow-up visit.  Denies any new complaints since last visit.  Says he has been trying to stay active and eat healthy.  He was able to lose some weight, however gained some back during the holidays.  He is working on losing them now.  He maintains follow-up with PCP and nephrologist.    Past Medical History:   Diagnosis Date    Decreased GFR     Gout     HL (hearing loss)     Hypertension     Kidney stone   "    No past surgical history on file.    MEDICATIONS    Current Outpatient Medications:     colchicine 0.6 MG tablet, Take 1 tablet by mouth., Disp: , Rfl:     fluticasone (FLONASE) 50 MCG/ACT nasal spray, , Disp: , Rfl:     telmisartan (MICARDIS) 20 MG tablet, Take 0.5 tablets by mouth Daily. (Patient not taking: Reported on 2024), Disp: 90 tablet, Rfl: 1    ALLERGIES:   No Known Allergies    SOCIAL HISTORY:       Social History     Socioeconomic History    Marital status:    Tobacco Use    Smoking status: Never    Smokeless tobacco: Never   Substance and Sexual Activity    Alcohol use: Yes    Drug use: Never    Sexual activity: Defer         FAMILY HISTORY:  Family History   Problem Relation Age of Onset    No Known Problems Mother     Other Father          from accident    Cancer Father     No Known Problems Sister        REVIEW OF SYSTEMS:  As per HPI       Vitals:    25 1125   BP: 130/89   Pulse: 70   Resp: 17   Temp: 98.1 °F (36.7 °C)   TempSrc: Oral   SpO2: 96%   Weight: 89.9 kg (198 lb 3.2 oz)   Height: 172.7 cm (67.99\")   PainSc: 0-No pain         2024     1:19 PM   Current Status   ECOG score 0      PHYSICAL EXAM:    CONSTITUTIONAL:  Vital signs reviewed.  No distress, looks comfortable.  EYES:  Conjunctiva and lids unremarkable.   EARS,NOSE,MOUTH,THROAT:  Ears and nose appear unremarkable.  Lips, teeth, gums appear unremarkable.  RESPIRATORY:  Normal respiratory effort.  Lungs clear to auscultation bilaterally.  CARDIOVASCULAR:  Normal S1, S2.  No murmurs rubs or gallops.  No significant lower extremity edema.  GASTROINTESTINAL: Abdomen appears unremarkable.  Nondistended  LYMPHATIC:  No cervical, supraclavicular lymphadenopathy.  NEURO: AAO x 3, no focal deficits.  Appears to have equal strength all 4 extremities.  MUSCULOSKELETAL:  Unremarkable digits/nails.  No cyanosis or clubbing.  No apparent joint deformities.  SKIN:  Warm.  No rashes.  PSYCHIATRIC:  Normal judgment and " insight.  Normal mood and affect.     RECENT LABS:        Lab on 02/06/2025   Component Date Value Ref Range Status    Glucose 02/06/2025 117 (H)  65 - 99 mg/dL Final    BUN 02/06/2025 25 (H)  8 - 23 mg/dL Final    Creatinine 02/06/2025 1.61 (H)  0.76 - 1.27 mg/dL Final    Sodium 02/06/2025 140  136 - 145 mmol/L Final    Potassium 02/06/2025 4.0  3.5 - 5.2 mmol/L Final    Chloride 02/06/2025 106  98 - 107 mmol/L Final    CO2 02/06/2025 23.3  22.0 - 29.0 mmol/L Final    Calcium 02/06/2025 9.5  8.6 - 10.5 mg/dL Final    Total Protein 02/06/2025 6.9  6.0 - 8.5 g/dL Final    Albumin 02/06/2025 4.1  3.5 - 5.2 g/dL Final    ALT (SGPT) 02/06/2025 18  1 - 41 U/L Final    AST (SGOT) 02/06/2025 24  1 - 40 U/L Final    Alkaline Phosphatase 02/06/2025 82  39 - 117 U/L Final    Total Bilirubin 02/06/2025 0.7  0.0 - 1.2 mg/dL Final    Globulin 02/06/2025 2.8  gm/dL Final    A/G Ratio 02/06/2025 1.5  g/dL Final    BUN/Creatinine Ratio 02/06/2025 15.5  7.0 - 25.0 Final    Anion Gap 02/06/2025 10.7  5.0 - 15.0 mmol/L Final    eGFR 02/06/2025 46.0 (L)  >60.0 mL/min/1.73 Final    WBC 02/06/2025 6.37  3.40 - 10.80 10*3/mm3 Final    RBC 02/06/2025 5.83 (H)  4.14 - 5.80 10*6/mm3 Final    Hemoglobin 02/06/2025 16.7  13.0 - 17.7 g/dL Final    Hematocrit 02/06/2025 49.7  37.5 - 51.0 % Final    MCV 02/06/2025 85.2  79.0 - 97.0 fL Final    MCH 02/06/2025 28.6  26.6 - 33.0 pg Final    MCHC 02/06/2025 33.6  31.5 - 35.7 g/dL Final    RDW 02/06/2025 12.4  12.3 - 15.4 % Final    RDW-SD 02/06/2025 38.0  37.0 - 54.0 fl Final    MPV 02/06/2025 9.1  6.0 - 12.0 fL Final    Platelets 02/06/2025 173  140 - 450 10*3/mm3 Final    Neutrophil % 02/06/2025 62.3  42.7 - 76.0 % Final    Lymphocyte % 02/06/2025 27.2  19.6 - 45.3 % Final    Monocyte % 02/06/2025 7.7  5.0 - 12.0 % Final    Eosinophil % 02/06/2025 2.0  0.3 - 6.2 % Final    Basophil % 02/06/2025 0.3  0.0 - 1.5 % Final    Immature Grans % 02/06/2025 0.5  0.0 - 0.5 % Final    Neutrophils, Absolute  02/06/2025 3.97  1.70 - 7.00 10*3/mm3 Final    Lymphocytes, Absolute 02/06/2025 1.73  0.70 - 3.10 10*3/mm3 Final    Monocytes, Absolute 02/06/2025 0.49  0.10 - 0.90 10*3/mm3 Final    Eosinophils, Absolute 02/06/2025 0.13  0.00 - 0.40 10*3/mm3 Final    Basophils, Absolute 02/06/2025 0.02  0.00 - 0.20 10*3/mm3 Final    Immature Grans, Absolute 02/06/2025 0.03  0.00 - 0.05 10*3/mm3 Final    nRBC 02/06/2025 0.0  0.0 - 0.2 /100 WBC Final         ASSESSMENT:  Patient is a pleasant 68-year-old male with medical history significant for hypertension, gout and CKD-3 who comes for polyp team evaluation and management.     # Polycythemia:   Patient was noted to have elevated hemoglobin/hematocrit of 18.0/53.1 on a routine CBC performed by his urologist, Dr. Padilla on 2/9/2024.  WBC, platelets and differential were within normal range.  On review of his chart, he had elevated hemoglobin of 17.2 back in November 2023 as well.  No prior blood test results are available at this time.   Patient states he moved to [place] only around October - November 2023.  Prior to that he was living in [place] and [place].  Patient denies any knowledge of having abnormally elevated RBC/hemoglobin or any other blood abnormalities.  No history of thrombosis.  Is a non-smoker.  He does have erectile dysfunction, however does not take any testosterone supplements.  Patient does report of snoring at nighttime and having issues with sleep apnea whenever he gains weight.  He has been trying to lose weight. No family history of blood or bone marrow disorders.  The CBC performed in clinic on 3/7/2024 noted high normal hemoglobin of 17.5 and hematocrit of 48.8.  Patient does not have any symptoms of hyperviscosity.  Since hemoglobin is still within normal range, will defer further workup at this time.    8/29/2024: CBC from today show mildly elevated hemoglobin of 17.2 with hematocrit of 52.6-overall stable.  Patient has lost some weight since last  visit.  He thinks the snoring has improved as well. Informed patient that his mild polycythemia is likely related to dehydration and underlying sleep apnea.  Patient was encouraged to maintain adequate hydration and possibly lose some weight if he can.  He is also considering blood donation when can.  2/6/2025: Patient is clinically asymptomatic.  No signs/symptoms of hyperviscosity.  The CBC from today shows overall stable to slightly improved hemoglobin of 16.7 g/dL and hematocrit of 49.7%.  Patient has not yet started donating blood.  Encouraged patient to continue to work on losing weight, maintain hydration and donate blood if able to.  Discussed plan to continue to monitor for now through his PCP.  I will see him back in this clinic on as-needed basis    # ED: As per urology.  Not on testosterone supplementation    # Hypertension and CKD: Following up with nephrology.  Encouraged to increase hydration.  As per PCP    PLAN:   -Likely dehydration and sleep apnea related mild polycythemia.  Improving  -Advised adequate hydration and losing weight  -Follow-up in as needed    No orders of the defined types were placed in this encounter.